# Patient Record
Sex: FEMALE | Race: WHITE | NOT HISPANIC OR LATINO | URBAN - METROPOLITAN AREA
[De-identification: names, ages, dates, MRNs, and addresses within clinical notes are randomized per-mention and may not be internally consistent; named-entity substitution may affect disease eponyms.]

---

## 2017-12-15 ENCOUNTER — APPOINTMENT (RX ONLY)
Dept: URBAN - METROPOLITAN AREA CLINIC 73 | Facility: CLINIC | Age: 22
Setting detail: DERMATOLOGY
End: 2017-12-15

## 2017-12-15 VITALS — WEIGHT: 140 LBS | HEIGHT: 64 IN

## 2017-12-15 DIAGNOSIS — L259 CONTACT DERMATITIS AND OTHER ECZEMA, UNSPECIFIED CAUSE: ICD-10-CM

## 2017-12-15 PROBLEM — L23.9 ALLERGIC CONTACT DERMATITIS, UNSPECIFIED CAUSE: Status: ACTIVE | Noted: 2017-12-15

## 2017-12-15 PROBLEM — F41.9 ANXIETY DISORDER, UNSPECIFIED: Status: ACTIVE | Noted: 2017-12-15

## 2017-12-15 PROCEDURE — ? TREATMENT REGIMEN

## 2017-12-15 PROCEDURE — 99202 OFFICE O/P NEW SF 15 MIN: CPT

## 2017-12-15 PROCEDURE — ? OTHER

## 2017-12-15 PROCEDURE — ? COUNSELING

## 2017-12-15 PROCEDURE — ? PRESCRIPTION

## 2017-12-15 RX ORDER — DESONIDE 0.5 MG/G
OINTMENT TOPICAL
Qty: 1 | Refills: 1 | Status: ERX | COMMUNITY
Start: 2017-12-15

## 2017-12-15 RX ORDER — PREDNISONE 10 MG/1
TABLET ORAL
Qty: 63 | Refills: 0 | Status: ERX | COMMUNITY
Start: 2017-12-15

## 2017-12-15 RX ADMIN — DESONIDE: 0.5 OINTMENT TOPICAL at 21:12

## 2017-12-15 RX ADMIN — PREDNISONE: 10 TABLET ORAL at 21:13

## 2017-12-15 ASSESSMENT — LOCATION DETAILED DESCRIPTION DERM
LOCATION DETAILED: LEFT SUPERIOR LATERAL NECK
LOCATION DETAILED: LEFT INFERIOR CENTRAL MALAR CHEEK

## 2017-12-15 ASSESSMENT — SEVERITY ASSESSMENT: SEVERITY: MILD TO MODERATE

## 2017-12-15 ASSESSMENT — LOCATION SIMPLE DESCRIPTION DERM
LOCATION SIMPLE: LEFT CHEEK
LOCATION SIMPLE: LEFT ANTERIOR NECK

## 2017-12-15 ASSESSMENT — LOCATION ZONE DERM
LOCATION ZONE: NECK
LOCATION ZONE: FACE

## 2017-12-15 ASSESSMENT — PAIN INTENSITY VAS: HOW INTENSE IS YOUR PAIN 0 BEING NO PAIN, 10 BEING THE MOST SEVERE PAIN POSSIBLE?: NO PAIN

## 2017-12-15 NOTE — PROCEDURE: OTHER
Other (Free Text): ~ 1 week hx of rash that started abruptly - c/o facial swelling and pruritus.  Went to  and reveived prednisone 60mg x 3 days and epinephrine.  Reports some improvement.  Had a similar reaction a few years ago which she things was due to poison ivy but feels that this rash is different.  No new foods or personal care products.Did not go hiking or was outdoors for prolonged period of time recently.\\n
Note Text (......Xxx Chief Complaint.): This diagnosis correlates with the
Detail Level: Zone

## 2017-12-15 NOTE — PROCEDURE: TREATMENT REGIMEN
Detail Level: Zone
Otc Regimen: Can take zyrtec 10mg BID
Initiate Treatment: Desonide ointment twice a day x 2 week \\nPrednisone 10mg taper- Take 6 tabs x 3 days, 5 tabs x 3 days, 4 tabs x 3 days, 3 tabs x 3 days, 2 tabs x 3 days, 1 tab x 3 days

## 2023-03-13 NOTE — HPI: RASH
03/13/23 1138   Readmission Assessment   Number of Days since last admission? 1-7 days   Previous Disposition Home with Family   Who is being Interviewed Patient   What was the patient's/caregiver's perception as to why they think they needed to return back to the hospital? Did not realize care needs would be so extensive   Did you visit your Primary Care Physician after you left the hospital, before you returned this time? No   Why weren't you able to visit your PCP? Did not have an appointment   Did you see a specialist, such as Cardiac, Pulmonary, Orthopedic Physician, etc. after you left the hospital? No   Who advised the patient to return to the hospital? Other Nurse (Navigator, CTN)  [facility] Supervisor advised to return to ER during hospital follow up call)   Does the patient report anything that got in the way of taking their medications? No   In our efforts to provide the best possible care to you and others like you, can you think of anything that we could have done to help you after you left the hospital the first time, so that you might not have needed to return so soon? Teaching during hospitalization regarding your illness; Improved written discharge instructions; Discharge instructions that are concise, clear, and non contradictory
How Severe Is Your Rash?: moderate
Is This A New Presentation, Or A Follow-Up?: Rash